# Patient Record
(demographics unavailable — no encounter records)

---

## 2024-11-28 NOTE — ASSESSMENT
[FreeTextEntry1] : #) Back pain: ML pain is musculoskeletal. US and Xray findings d/w dtr in detail.  Massage and heating pad advised.   #) Alzheimer's Dementia:  Pt is at baseline.  Continue Donepezil.   #) DM: Continue metformin.   #) A fib: Pt's A Fib symptoms are well controlled.  Continue Xarelto.   #) HLD: Continue atorvastatin. Low fat diet advised.   #) HTN: Well controlled. Continue amlodipine.   f/up in 2-3 months

## 2024-11-28 NOTE — HISTORY OF PRESENT ILLNESS
[FreeTextEntry1] : Alzheimer's dementia, A fib and old age with gait instability.  [FreeTextEntry2] : Pt was seen and examined at her home in the presence of her daughter. Her daughter was very concerned about pt's back pain. She was thinking it may be from her kidneys. Pt already had an US and back xray. She also had some blood w/up. Otherwise pt is at baseline. All other reviews of systems were unremarkable as per her dtr.

## 2024-11-28 NOTE — PHYSICAL EXAM
[No Acute Distress] : no acute distress [Normal Outer Ear/Nose] : the ears and nose were normal in appearance [Normal Nasal Mucosa] : the nasal mucosa was normal [No LAD] : no lymphadenopathy [Thyroid Normal, No Nodules] : the thyroid was normal and there were no nodules present [No Respiratory Distress] : no respiratory distress [Clear to Auscultation] : lungs were clear to auscultation bilaterally [No Accessory Muscle Use] : no accessory muscle use [Normal Rate] : heart rate was normal  [Normal S1, S2] : normal S1 and S2 [Non Tender] : non-tender [Soft] : abdomen soft [Not Distended] : not distended [Normal Post Cervical Nodes] : no posterior cervical lymphadenopathy [Normal Anterior Cervical Nodes] : no anterior cervical lymphadenopathy [de-identified] : Lying comfortably in bed

## 2024-11-28 NOTE — PHYSICAL EXAM
[No Acute Distress] : no acute distress [Normal Outer Ear/Nose] : the ears and nose were normal in appearance [Normal Nasal Mucosa] : the nasal mucosa was normal [No LAD] : no lymphadenopathy [Thyroid Normal, No Nodules] : the thyroid was normal and there were no nodules present [No Respiratory Distress] : no respiratory distress [Clear to Auscultation] : lungs were clear to auscultation bilaterally [No Accessory Muscle Use] : no accessory muscle use [Normal Rate] : heart rate was normal  [Normal S1, S2] : normal S1 and S2 [Non Tender] : non-tender [Soft] : abdomen soft [Not Distended] : not distended [Normal Post Cervical Nodes] : no posterior cervical lymphadenopathy [Normal Anterior Cervical Nodes] : no anterior cervical lymphadenopathy [de-identified] : Lying comfortably in bed

## 2025-01-19 NOTE — ASSESSMENT
[FreeTextEntry1] : #) Hospital Discharge f/up: Pt is stable. She is not having any hematuria. Xarelto is on hold. Pt already has an apt with urology.  Pt also needs an MRI for right renal and hepatic lesions evaluation.   #) Alzheimer's Dementia:  Pt is at baseline.  Continue Donepezil.   #) DM: Continue metformin.   #) A fib: Pt's atrial fibrillation symptoms are well controlled.  Xarelto is on hold because of hematuria.   #) HLD: Continue atorvastatin. Low fat diet advised.   #) HTN: Well controlled. Continue amlodipine.   f/up in 2-3 months

## 2025-01-19 NOTE — REASON FOR VISIT
[Post Hospitalization] : a post hospitalization visit [Family Member] : family member [Pre-Visit Preparation] : pre-visit preparation was done

## 2025-01-19 NOTE — PHYSICAL EXAM
[No Acute Distress] : no acute distress [Normal Outer Ear/Nose] : the ears and nose were normal in appearance [Normal Nasal Mucosa] : the nasal mucosa was normal [Thyroid Normal, No Nodules] : the thyroid was normal and there were no nodules present [No LAD] : no lymphadenopathy [No Respiratory Distress] : no respiratory distress [Clear to Auscultation] : lungs were clear to auscultation bilaterally [No Accessory Muscle Use] : no accessory muscle use [Normal Rate] : heart rate was normal  [Normal S1, S2] : normal S1 and S2 [Non Tender] : non-tender [Not Distended] : not distended [Soft] : abdomen soft [Normal Post Cervical Nodes] : no posterior cervical lymphadenopathy [Normal Anterior Cervical Nodes] : no anterior cervical lymphadenopathy [No CVA Tenderness] : no ~M costovertebral angle tenderness [No Spinal Tenderness] : no spinal tenderness [de-identified] : Lying comfortably in bed

## 2025-01-19 NOTE — HISTORY OF PRESENT ILLNESS
[FreeTextEntry1] : Alzheimer's dementia, A fib and old age with gait instability.  [FreeTextEntry2] : She was recently admitted in a hospital for gross hematuria. Her CT scan showed bladder wall thickening. She was advised to follow up with a urologist as outpatient to rule out any mass. Her Xarelto was also stopped. Her hematuria is stopped now. She was also found to have right renal and hepatic lesions. She was advised to have an MRI as outpatient. Dtr will discuss with her urologist. Her behavior is controlled. She did not have any fever or chills. She is eating and drinking well. All other reviews of system were stable as per her daughter.

## 2025-05-15 NOTE — PHYSICAL EXAM
[General Appearance - Well Developed] : well developed [General Appearance - In No Acute Distress] : no acute distress [] : no respiratory distress [Left Infraclavicular] : left infraclavicular area [Well-Healed] : well-healed [Abdomen Soft] : soft [Nail Clubbing] : no clubbing of the fingernails [FreeTextEntry1] : irregular [Purulent Drainage] : no purulent drainage [Serosanguineous Drainage] : no serosanquineous drainage [Erythema] : not erythematous [Tender] : not tender

## 2025-05-15 NOTE — ASSESSMENT
[FreeTextEntry1] : # Tachybrady syndrome s/p single chamber PPM on 3/18/2020 - Device interrogation normal. I interrogated and reprogrammed the device as described above.  - No events - The patient is on remote and transmitting.   # PAF - Pt is now OFF Xarelto d/t hematuria. Pt is to f/u with urology.  # HTN - BP well controlled - Continue amlodipine 5mg QD   RTO in 9-12 months

## 2025-05-15 NOTE — CARDIOLOGY SUMMARY
[de-identified] : ECG ( 3/27/2020) 64 bpm afib/paced  ms,  [de-identified] : ECHO (3/8/2020) EF 65% mild MR, AR moderate to severe tricuspid regurg

## 2025-05-15 NOTE — PROCEDURE
[Atrial Fibrillation] : atrial fibrillation [Pacemaker] : pacemaker [VVIR] : VVIR [Voltage: ___ volts] : Voltage was [unfilled] volts [Magnet Rate: ___ Ppm] : magnet rate was [unfilled] Ppm [Threshold Testing Performed] : Threshold testing was performed [Ventricular] : Ventricular [___V @] : [unfilled] V [___ ms] : [unfilled] ms [None] : none [Programmed for Longevity] : output reprogrammed for improved battery longevity [Sense ___ %] : Sense [unfilled]% [Lead Imp:  ___ohms] : lead impedance was [unfilled] ohms [Sensing Amplitude ___mv] : sensing amplitude was [unfilled] mv [Pace ___ %] : Pace [unfilled]% [de-identified] : 38 BPM [de-identified] : CHARISMA [de-identified] : Mabel MEDINA SR MRI [id number] [de-identified] : [id number] [de-identified] : 3/18/2020 [de-identified] : [id number] [de-identified] : 8.7 years [de-identified] : No events Transmitting on [facility]

## 2025-05-15 NOTE — HISTORY OF PRESENT ILLNESS
[de-identified] : Cardiologist: Dr. Emily Sultana  95F wheelchair bound female with PMHx of HTN, DM, dementia, afib on eliquis. Admitted to [facility] on 3/8/2020 for episodes of seizure/syncope, had GI bleed, had colonoscopy with polypectomy done. CTH was negative. TTE was normal. Dx with tachy zac syndrome, leadless PM aborted due to tortuous blood vessel anatomy. A single chamber medtronic PPM implanted on 3/18/2020.  Had 4/2024 hospitalization for black stools requiring a blood transfusion. She is now taking Xarelto every other day and not having any more black stools.   1/2025: Pt was recently admitted in the hospital for gross hematuria. Her Xarelto was also stopped. Her hematuria is stopped now.   She presents for routine follow up. She is with her aid. The patient offers no complaints.  Denies CP, palpitations, SOB, dizziness, BARRERA, PND, syncope.

## 2025-07-28 NOTE — HISTORY OF PRESENT ILLNESS
[FreeTextEntry1] : Patient seen and examined at home.  Patient is homebound 2/2 generalized weakness/debility and advanced age.  Patient with complaints of cough and congestion 7/22.  CXR done at that time negative.  Patient treated with

## 2025-07-28 NOTE — ASSESSMENT
[FreeTextEntry1] : #Cough - improved - s/p *** - CXR negative 7/22 - symptomatic Rx - Advised to call w/changes in status.